# Patient Record
Sex: FEMALE | Race: WHITE | Employment: UNEMPLOYED | URBAN - METROPOLITAN AREA
[De-identification: names, ages, dates, MRNs, and addresses within clinical notes are randomized per-mention and may not be internally consistent; named-entity substitution may affect disease eponyms.]

---

## 2023-07-11 ENCOUNTER — OFFICE VISIT (OUTPATIENT)
Dept: URGENT CARE | Age: 18
End: 2023-07-11

## 2023-07-11 VITALS
OXYGEN SATURATION: 97 % | BODY MASS INDEX: 24.1 KG/M2 | RESPIRATION RATE: 12 BRPM | WEIGHT: 136 LBS | DIASTOLIC BLOOD PRESSURE: 68 MMHG | SYSTOLIC BLOOD PRESSURE: 104 MMHG | HEIGHT: 63 IN | HEART RATE: 104 BPM | TEMPERATURE: 99.4 F

## 2023-07-11 DIAGNOSIS — H00.014 HORDEOLUM EXTERNUM OF LEFT UPPER EYELID: Primary | ICD-10-CM

## 2023-07-11 RX ORDER — NORGESTIMATE AND ETHINYL ESTRADIOL 0.25-0.035
1 KIT ORAL DAILY
COMMUNITY
Start: 2020-08-31

## 2023-07-11 ASSESSMENT — ENCOUNTER SYMPTOMS
GASTROINTESTINAL NEGATIVE: 1
EYE PAIN: 0
ALLERGIC/IMMUNOLOGIC NEGATIVE: 1
EYE ITCHING: 1
RESPIRATORY NEGATIVE: 1
EYE REDNESS: 1
EYE DISCHARGE: 0
PHOTOPHOBIA: 0

## 2023-07-11 ASSESSMENT — VISUAL ACUITY: OU: 1

## 2023-07-11 NOTE — PROGRESS NOTES
external ear normal. There is no impacted cerumen. Nose: Nose normal. No congestion or rhinorrhea. Mouth/Throat:      Mouth: Mucous membranes are moist.      Pharynx: No oropharyngeal exudate or posterior oropharyngeal erythema. Eyes:      General: Lids are normal. Lids are everted, no foreign bodies appreciated. Vision grossly intact. No allergic shiner, visual field deficit or scleral icterus. Right eye: No foreign body, discharge or hordeolum. Left eye: Hordeolum present. No foreign body or discharge. Extraocular Movements: Extraocular movements intact. Right eye: Normal extraocular motion and no nystagmus. Left eye: Normal extraocular motion and no nystagmus. Conjunctiva/sclera: Conjunctivae normal.      Pupils: Pupils are equal, round, and reactive to light. Funduscopic exam:     Right eye: Red reflex present. Left eye: Red reflex present. Visual Fields: Right eye visual fields normal and left eye visual fields normal.     Cardiovascular:      Rate and Rhythm: Regular rhythm. Tachycardia present. Pulses: Normal pulses. Heart sounds: Normal heart sounds. No murmur heard. Pulmonary:      Effort: Pulmonary effort is normal. No respiratory distress. Breath sounds: Normal breath sounds. Abdominal:      Palpations: Abdomen is soft. Musculoskeletal:      Cervical back: Normal range of motion and neck supple. No rigidity or tenderness. Lymphadenopathy:      Cervical: No cervical adenopathy. Skin:     General: Skin is warm. Neurological:      Mental Status: She is alert and oriented to person, place, and time. Psychiatric:         Behavior: Behavior normal.         An electronic signature was used to authenticate this note.     --Sheila Casanova, APRN - CNP